# Patient Record
Sex: MALE | URBAN - METROPOLITAN AREA
[De-identification: names, ages, dates, MRNs, and addresses within clinical notes are randomized per-mention and may not be internally consistent; named-entity substitution may affect disease eponyms.]

---

## 2023-06-21 ENCOUNTER — OFFICE (OUTPATIENT)
Dept: URBAN - METROPOLITAN AREA CLINIC 77 | Facility: CLINIC | Age: 58
Setting detail: OPHTHALMOLOGY
End: 2023-06-21
Payer: MEDICAID

## 2023-06-21 DIAGNOSIS — H25.13: ICD-10-CM

## 2023-06-21 DIAGNOSIS — E11.9: ICD-10-CM

## 2023-06-21 DIAGNOSIS — H52.4: ICD-10-CM

## 2023-06-21 DIAGNOSIS — H40.013: ICD-10-CM

## 2023-06-21 PROCEDURE — 92250 FUNDUS PHOTOGRAPHY W/I&R: CPT | Performed by: OPHTHALMOLOGY

## 2023-06-21 PROCEDURE — 92015 DETERMINE REFRACTIVE STATE: CPT | Performed by: OPHTHALMOLOGY

## 2023-06-21 PROCEDURE — 92004 COMPRE OPH EXAM NEW PT 1/>: CPT | Performed by: OPHTHALMOLOGY

## 2023-06-21 ASSESSMENT — REFRACTION_AUTOREFRACTION
OD_CYLINDER: -1.50
OS_CYLINDER: -1.75
OD_SPHERE: -3.00
OD_AXIS: 104
OS_SPHERE: -2.00
OS_AXIS: 071

## 2023-06-21 ASSESSMENT — REFRACTION_MANIFEST
OS_SPHERE: -2.25
OS_AXIS: 70
OD_ADD: +3.00
OS_VA1: 20/25
OS_ADD: +3.00
OD_CYLINDER: -1.25
OS_CYLINDER: -1.50
OD_VA1: 20/25
OD_SPHERE: -3.50
OD_AXIS: 105

## 2023-06-21 ASSESSMENT — TONOMETRY
OS_IOP_MMHG: 16
OD_IOP_MMHG: 17

## 2023-06-21 ASSESSMENT — KERATOMETRY
OD_K1POWER_DIOPTERS: 43.50
OD_AXISANGLE_DEGREES: 029
OS_K1POWER_DIOPTERS: 43.75
OS_AXISANGLE_DEGREES: 160
OD_K2POWER_DIOPTERS: 45.00
OS_K2POWER_DIOPTERS: 45.00

## 2023-06-21 ASSESSMENT — REFRACTION_CURRENTRX
OD_CYLINDER: -1.25
OD_OVR_VA: 20/
OS_OVR_VA: 20/
OS_AXIS: 070
OD_AXIS: 130
OS_ADD: +2.75
OS_SPHERE: -2.50
OD_ADD: +2.75
OS_CYLINDER: -1.50
OD_SPHERE: -4.00

## 2023-06-21 ASSESSMENT — SPHEQUIV_DERIVED
OD_SPHEQUIV: -3.75
OS_SPHEQUIV: -2.875
OD_SPHEQUIV: -4.125
OS_SPHEQUIV: -3

## 2023-06-21 ASSESSMENT — CONFRONTATIONAL VISUAL FIELD TEST (CVF)
OS_FINDINGS: FULL
OD_FINDINGS: FULL

## 2023-06-21 ASSESSMENT — AXIALLENGTH_DERIVED
OS_AL: 24.4709
OD_AL: 24.8402
OS_AL: 24.4186
OD_AL: 25.0032

## 2023-06-21 ASSESSMENT — VISUAL ACUITY
OD_BCVA: 20/25
OS_BCVA: 20/40

## 2024-09-25 ENCOUNTER — APPOINTMENT (OUTPATIENT)
Dept: DERMATOLOGY | Facility: CLINIC | Age: 59
End: 2024-09-25
Payer: COMMERCIAL

## 2024-09-25 VITALS — BODY MASS INDEX: 30.06 KG/M2 | HEIGHT: 70 IN | WEIGHT: 210 LBS

## 2024-09-25 DIAGNOSIS — E11.628 TYPE 2 DIABETES MELLITUS WITH OTHER SKIN COMPLICATIONS: ICD-10-CM

## 2024-09-25 DIAGNOSIS — L73.8 OTHER SPECIFIED FOLLICULAR DISORDERS: ICD-10-CM

## 2024-09-25 DIAGNOSIS — L85.3 XEROSIS CUTIS: ICD-10-CM

## 2024-09-25 DIAGNOSIS — D22.9 MELANOCYTIC NEVI, UNSPECIFIED: ICD-10-CM

## 2024-09-25 DIAGNOSIS — L98.8 TYPE 2 DIABETES MELLITUS WITH OTHER SKIN COMPLICATIONS: ICD-10-CM

## 2024-09-25 DIAGNOSIS — D18.01 HEMANGIOMA OF SKIN AND SUBCUTANEOUS TISSUE: ICD-10-CM

## 2024-09-25 PROBLEM — Z00.00 ENCOUNTER FOR PREVENTIVE HEALTH EXAMINATION: Status: ACTIVE | Noted: 2024-09-25

## 2024-09-25 PROCEDURE — 99203 OFFICE O/P NEW LOW 30 MIN: CPT

## 2024-09-25 NOTE — CONSULT LETTER
[Dear  ___] : Dear  [unfilled], [Consult Letter:] : I had the pleasure of evaluating your patient, [unfilled]. [Please see my note below.] : Please see my note below. [Consult Closing:] : Thank you very much for allowing me to participate in the care of this patient.  If you have any questions, please do not hesitate to contact me. [Sincerely,] : Sincerely, [FreeTextEntry3] : Brittany Holland MD Department of Dermatology [facility]

## 2024-09-25 NOTE — HISTORY OF PRESENT ILLNESS
[FreeTextEntry1] : np bumps [de-identified] : Referred by: Dr Manish De Jesus  Mr. JOAN PACK  is a 59 year old M w/ DM here for evaluation of below  swelling under L eye that occurred a few weeks ago, painful, slowly decreasing in size. had in the past in same spot but this time lasted longer bump on forehead x yrs spots on legs x yrs dry skin, not moisturizing spots on trunk x yrs   no personal or family h/o skin cancer

## 2024-09-25 NOTE — CONSULT LETTER
[Dear  ___] : Dear  [unfilled], [Consult Letter:] : I had the pleasure of evaluating your patient, [unfilled]. [Please see my note below.] : Please see my note below. [Consult Closing:] : Thank you very much for allowing me to participate in the care of this patient.  If you have any questions, please do not hesitate to contact me. [Sincerely,] : Sincerely, [FreeTextEntry3] : Brittany Holland MD Department of Dermatology [facility]

## 2024-09-25 NOTE — PHYSICAL EXAM
[Alert] : alert [Oriented x 3] : ~L oriented x 3 [FreeTextEntry3] : few yellow papules on forehead L lower eyelid/L cheek without any appreciable nodules or swelling b/l shins with hyperpigmented patches xerosis nevi, cherry angiomas on trunk

## 2024-09-25 NOTE — HISTORY OF PRESENT ILLNESS
[FreeTextEntry1] : np bumps [de-identified] : Referred by: Dr Manish De Jesus  Mr. JOAN PACK  is a 59 year old M w/ DM here for evaluation of below  swelling under L eye that occurred a few weeks ago, painful, slowly decreasing in size. had in the past in same spot but this time lasted longer bump on forehead x yrs spots on legs x yrs dry skin, not moisturizing spots on trunk x yrs   no personal or family h/o skin cancer

## 2024-09-25 NOTE — ASSESSMENT
[FreeTextEntry1] : xerosis dry skin care reviewed, handout provided. Switch to recommended products in handout and moisturize liberally. rec amlactin/euc roughness relief cream/cerave diabetic relief cream BID  diabetic dermopathy discussed nature and course of condition, including treatment options risks/benefits/alternatives involved discussed challenges in treating, discussed low efficacy of any topical/oral medication/lasers encouraged strict control of blood sugar levels  cherry seb hyp nevi - Counseled about clinically and dermatoscopically benign lesions - No treatment indicated at this time - Counseled patient to notify us of any changes   L eyelid -  no appreciable lesion at present ddx includes cyst that became inflamed and has since decreased in size reassured can RTC if recurs